# Patient Record
Sex: MALE | Race: WHITE | NOT HISPANIC OR LATINO | Employment: FULL TIME | URBAN - METROPOLITAN AREA
[De-identification: names, ages, dates, MRNs, and addresses within clinical notes are randomized per-mention and may not be internally consistent; named-entity substitution may affect disease eponyms.]

---

## 2017-01-04 ENCOUNTER — ALLSCRIPTS OFFICE VISIT (OUTPATIENT)
Dept: OTHER | Facility: OTHER | Age: 62
End: 2017-01-04

## 2017-01-26 ENCOUNTER — ALLSCRIPTS OFFICE VISIT (OUTPATIENT)
Dept: OTHER | Facility: OTHER | Age: 62
End: 2017-01-26

## 2017-10-16 DIAGNOSIS — Z12.5 ENCOUNTER FOR SCREENING FOR MALIGNANT NEOPLASM OF PROSTATE: ICD-10-CM

## 2017-10-16 DIAGNOSIS — E07.9 DISORDER OF THYROID: ICD-10-CM

## 2017-10-16 DIAGNOSIS — R00.0 TACHYCARDIA: ICD-10-CM

## 2017-10-16 DIAGNOSIS — Z00.00 ENCOUNTER FOR GENERAL ADULT MEDICAL EXAMINATION WITHOUT ABNORMAL FINDINGS: ICD-10-CM

## 2017-10-19 ENCOUNTER — ALLSCRIPTS OFFICE VISIT (OUTPATIENT)
Dept: OTHER | Facility: OTHER | Age: 62
End: 2017-10-19

## 2017-10-20 NOTE — PROGRESS NOTES
Assessment  1  Leg pain, bilateral (729 5) (M79 664,M79 606)    Plan  Acute low back pain    · Diclofenac Sodium 75 MG Oral Tablet Delayed Release; take 1 tablet twice daily  after meals   · PredniSONE 20 MG Oral Tablet; 1 bid x 5 days , 1 qd x 5 days  Encounter for screening colonoscopy    · COLONOSCOPY; Status:Active - Retrospective Authorization; Requested UFD:[id number]; Thyroid disease    · Levothyroxine Sodium 125 MCG Oral Tablet; take 1 tablet by mouth once daily    Discussion/Summary    Will have patient complete 10 day course of prednisone  Prescribed Voltaren as needed for back pain  Will have patient get MRI if no relief of symptoms  Chief Complaint  bilateral leg pain from lower back  MM-REVIEW LABS/MEDICATIONScolo      History of Present Illness  63year old male presents for annual physical  complains of intermittent throbbing bilateral leg pain  Better with walking and states it is worse with standing still  States he has been taking medication for the back pain with little relief  States he would like something else for the pain  Denies other current medical concerns  Review of Systems    Constitutional: No fever or chills, feels well, no tiredness, no recent weight gain or weight loss  Eyes: No complaints of eye pain, no red eyes, no discharge from eyes, no itchy eyes  ENT: no complaints of earache, no hearing loss, no nosebleeds, no nasal discharge, no sore throat, no hoarseness  Cardiovascular: No complaints of slow heart rate, no fast heart rate, no chest pain, no palpitations, no leg claudication, no lower extremity  Respiratory: No complaints of shortness of breath, no wheezing, no cough, no SOB on exertion, no orthopnea or PND  Gastrointestinal: No complaints of abdominal pain, no constipation, no nausea or vomiting, no diarrhea or bloody stools  Musculoskeletal: limb pain,-- myalgias-- and-- joint stiffness, but-- as noted in HPI     Neurological: No compliants of headache, no confusion, no convulsions, no numbness or tingling, no dizziness or fainting, no limb weakness, no difficulty walking  Psychiatric: Is not suicidal, no sleep disturbances, no anxiety or depression, no change in personality, no emotional problems  Active Problems  1  Acute bronchitis, unspecified organism (466 0) (J20 9)   2  Chest pain (786 50) (R07 9)   3  Cough (786 2) (R05)   4  Encounter for prostate cancer screening (V76 44) (Z12 5)   5  Encounter for screening colonoscopy (V76 51) (Z12 11)   6  Influenza (487 1) (J11 1)   7  Knee arthropathy (716 96) (M17 10)   8  Lightheadedness (780 4) (R42)   9  Palpitations (785 1) (R00 2)   10  Proctalgia fugax (564 6) (K59 4)   11  Tachycardia (785 0) (R00 0)   12  Thyroid disease (246 9) (E07 9)    Past Medical History  1  History of Acute low back pain (724 2) (M54 5)   2  History of Hearing problem (V41 2) (H91 90)   3  History of arthritis (V13 4) (Z87 39)    The active problems and past medical history were reviewed and updated today  Surgical History  1  History of Hernia Repair   2  History of Knee Surgery   3  History of Neuroplasty Median Nerve At Carpal Tunnel    Family History  Mother    1  Family history of arthritis (V17 7) (Z82 61)  Father    2  Family history of arthritis (V17 7) (Z82 61)   3  Family history of coronary artery disease (V17 3) (Z82 49)   4  Family history of diabetes mellitus (V18 0) (Z83 3)   5  Family history of hyperlipidemia (V18 19) (Z83 49)   6  Family history of hypertension (V17 49) (Z82 49)  Sister    9  Family history of diabetes mellitus (V18 0) (Z83 3)   8  Family history of hypertension (V17 49) (Z82 49)   9  Family history of sudden cardiac death (SCD) (V17 41) (Z82 41)    Social History   · Former smoker (X32 43) (M20 174)   ·     Current Meds   1  Azithromycin 250 MG Oral Tablet; TAKE 2 TABLETS ON DAY 1 THEN TAKE 1 TABLET A   DAY FOR 4 DAYS;    Therapy: 72CGR5620 to (Last Rx:26Jan2017) Requested for: 11YIZ8434 Ordered   2  Butalbital-APAP-Caffeine -40 MG Oral Tablet; TAKE 1 OR 2 TABLETS EVERY 4   HOURS AS NEEDED FOR HEADACHE  DO NOT EXCEED 6 TABLETS IN 24 HOURS; Therapy: 60Svp1435 to (Evaluate:12Jun2016); Last Rx:13Apr2016 Ordered   3  Cheratussin -10 MG/5ML Oral Syrup; 2 tsp every 6 hours as needed cough; Therapy: 41FDC5114 to (Last Rx:21Jan2017) Ordered   4  Levothyroxine Sodium 100 MCG Oral Tablet; take one tablet by mouth daily; Therapy: 41NID8238 to (Last Alric Marissa)  Requested for: 69BGX3024 Ordered   5  Metoprolol Tartrate 25 MG Oral Tablet; TAKE 1 TABLET TWICE DAILY; Therapy: 29ORM7676 to (([phone])  Requested for: 97Gpr7256; Last   Rx:84Fqj7768; Status: ACTIVE - Retrospective By Protocol Authorization Ordered   6  PredniSONE 20 MG Oral Tablet; 1 bid x 5 days , 1 qd x 5 days; Therapy: 56TJC5183 to (Evaluate:88Hnc2515)  Requested for: 44IAZ7374; Last   Rx:26Jan2017 Ordered   7  RaNITidine HCl - 150 MG Oral Tablet; TAKE 1 TABLET DAILY AS NEEDED; Therapy: 85XBV9111 to Recorded   8  Sleep Aid (DiphenhydrAMINE) 25 MG Oral Tablet; TAKE 1 TABLET AT BEDTIME; Therapy: 26COX7624 to Recorded    Allergies  1  Penicillins    Vitals  Vital Signs    Recorded: 86NNL8316 01:22PM   Heart Rate 68   Systolic 508   Diastolic 72   Height 5 ft 10 in   Weight 214 lb    BMI Calculated 30 71   BSA Calculated 2 15   O2 Saturation 98     Physical Exam    Constitutional   General appearance: No acute distress, well appearing and well nourished  Pulmonary   Respiratory effort: No increased work of breathing or signs of respiratory distress  Auscultation of lungs: Clear to auscultation, equal breath sounds bilaterally, no wheezes, no rales, no rhonci  Cardiovascular   Auscultation of heart: Normal rate and rhythm, normal S1 and S2, without murmurs      Examination of extremities for edema and/or varicosities: Normal     Lymphatic   Palpation of lymph nodes in neck: No lymphadenopathy  Musculoskeletal   Gait and station: Normal     Digits and nails: Normal without clubbing or cyanosis  Inspection/palpation of joints, bones, and muscles: Normal          Health Management  Encounter for screening colonoscopy   COLONOSCOPY; every 10 years; Next Due: 34PDM4001;  Overdue    Signatures   Electronically signed by : Radha Bolaños MD; Oct 19 2017  2:26PM EST                       (Author)

## 2017-12-18 ENCOUNTER — ALLSCRIPTS OFFICE VISIT (OUTPATIENT)
Dept: OTHER | Facility: OTHER | Age: 62
End: 2017-12-18

## 2018-01-13 VITALS
BODY MASS INDEX: 30.64 KG/M2 | WEIGHT: 214 LBS | DIASTOLIC BLOOD PRESSURE: 72 MMHG | HEIGHT: 70 IN | OXYGEN SATURATION: 98 % | HEART RATE: 68 BPM | SYSTOLIC BLOOD PRESSURE: 122 MMHG

## 2018-01-13 VITALS
TEMPERATURE: 99.1 F | BODY MASS INDEX: 30.35 KG/M2 | HEIGHT: 70 IN | RESPIRATION RATE: 18 BRPM | HEART RATE: 64 BPM | SYSTOLIC BLOOD PRESSURE: 120 MMHG | OXYGEN SATURATION: 96 % | WEIGHT: 212 LBS | DIASTOLIC BLOOD PRESSURE: 80 MMHG

## 2018-01-14 VITALS
BODY MASS INDEX: 30.35 KG/M2 | TEMPERATURE: 97.9 F | OXYGEN SATURATION: 98 % | SYSTOLIC BLOOD PRESSURE: 114 MMHG | DIASTOLIC BLOOD PRESSURE: 68 MMHG | HEART RATE: 65 BPM | WEIGHT: 212 LBS | HEIGHT: 70 IN

## 2018-01-23 VITALS
OXYGEN SATURATION: 98 % | HEIGHT: 70 IN | SYSTOLIC BLOOD PRESSURE: 128 MMHG | HEART RATE: 66 BPM | BODY MASS INDEX: 30.64 KG/M2 | DIASTOLIC BLOOD PRESSURE: 74 MMHG | WEIGHT: 214 LBS

## 2018-01-23 NOTE — PROGRESS NOTES
Assessment    1  Encounter for preventive health examination (V70 0) (Z00 00)    Discussion/Summary  Advice and education were given regarding aerobic exercise and sunscreen use  Chief Complaint  Routine PE      Advance Directives  Advance Directive [facility]:   NO - Patient does not have an advance health care directive  History of Present Illness  HM, Adult Male: The patient is being seen for a health maintenance evaluation  Social History: Household members include spouse  He is   Work status: working full time  The patient is a former cigarette smoker and has never used smokeless tobacco  He reports rare alcohol use and denies binge drinking  The patient has no concerns about alcohol abuse  He has never used illicit drugs  General Health: The patient's health since the last visit is described as good  He has regular dental visits  He denies vision problems  He has hearing loss  hearing is slightly decreased  Immunizations status: up to date  Lifestyle:  He consumes a diverse and healthy diet  He does not have any weight concerns  He does not exercise regularly  He does not use tobacco  He denies alcohol use  He denies drug use  Reproductive health:  the patient is sexually active  Screening: Active Problems    1  Acute bronchitis, unspecified organism (466 0) (J20 9)   2  Chest pain (786 50) (R07 9)   3  Cough (786 2) (R05)   4  Encounter for prostate cancer screening (V76 44) (Z12 5)   5  Encounter for screening colonoscopy (V76 51) (Z12 11)   6  Influenza (487 1) (J11 1)   7  Knee arthropathy (716 96) (M17 10)   8  Leg pain, bilateral (729 5) (M79 604,M79 605)   9  Lightheadedness (780 4) (R42)   10  Palpitations (785 1) (R00 2)   11  Proctalgia fugax (564 6) (K59 4)   12  Tachycardia (785 0) (R00 0)   13   Thyroid disease (246 9) (E07 9)    Past Medical History    · History of Acute low back pain (724 2) (M54 5)   · History of Hearing problem (V41 2) (H91 90)   · History of arthritis (V13 4) (Z87 39)    Surgical History    · History of Hernia Repair   · History of Knee Surgery   · History of Neuroplasty Median Nerve At Carpal Tunnel    Family History  Mother    · Family history of arthritis (V17 7) (Z82 61)  Father    · Family history of arthritis (V17 7) (Z82 61)   · Family history of coronary artery disease (V17 3) (Z82 49)   · Family history of diabetes mellitus (V18 0) (Z83 3)   · Family history of hyperlipidemia (V18 19) (Z83 49)   · Family history of hypertension (V17 49) (Z82 49)  Sister    · Family history of diabetes mellitus (V18 0) (Z83 3)   · Family history of hypertension (V17 49) (Z82 49)   · Family history of sudden cardiac death (SCD) (V17 41) (Z82 41)  Family History    · Denied: Family history of substance abuse   · No family history of mental disorder    Social History    · Former smoker (R85 19) (D24 212)   ·     Current Meds   1  Azithromycin 250 MG Oral Tablet; TAKE 2 TABLETS ON DAY 1 THEN TAKE 1 TABLET A   DAY FOR 4 DAYS; Therapy: 91TQN3357 to (Last Rx:26Jan2017)  Requested for: 26Jan2017 Ordered   2  Butalbital-APAP-Caffeine -40 MG Oral Tablet; TAKE 1 OR 2 TABLETS EVERY 4   HOURS AS NEEDED FOR HEADACHE  DO NOT EXCEED 6 TABLETS IN 24 HOURS; Therapy: 13Apr2016 to (Evaluate:12Jun2016); Last Rx:13Apr2016 Ordered   3  Cheratussin -10 MG/5ML Oral Syrup; 2 tsp every 6 hours as needed cough; Therapy: 53HNA3903 to (Last Rx:21Jan2017) Ordered   4  Diclofenac Sodium 75 MG Oral Tablet Delayed Release; take 1 tablet twice daily after   meals; Therapy: 00URZ4281 to (Evaluate:29Fey0071)  Requested for: 19Oct2017; Last   Rx:19Oct2017 Ordered   5  Levothyroxine Sodium 125 MCG Oral Tablet; take 1 tablet by mouth once daily; Therapy: 10FMB4212 to (Evaluate:41Zhu1660)  Requested for: 19Oct2017; Last   Rx:19Oct2017 Ordered   6  Metoprolol Tartrate 25 MG Oral Tablet; TAKE 1 TABLET TWICE DAILY;    Therapy: 07DOU1241 to ([phone])  Requested for: 22XKN1075; Last   Rx:38Ylb2433; Status: ACTIVE - Retrospective By Protocol Authorization Ordered   7  PredniSONE 20 MG Oral Tablet; 1 bid x 5 days , 1 qd x 5 days; Therapy: 74HJG9163 to (Kb Simmering)  Requested for: 19Oct2017; Last   Rx:19Oct2017 Ordered   8  RaNITidine HCl - 150 MG Oral Tablet; TAKE 1 TABLET DAILY AS NEEDED; Therapy: 10DBS5263 to Recorded   9  Sleep Aid (DiphenhydrAMINE) 25 MG Oral Tablet; TAKE 1 TABLET AT BEDTIME; Therapy: 83UJP2862 to Recorded    Allergies    1  Penicillins    Vitals   Recorded: 68VFH7097 07:29AM   Heart Rate 66   Systolic 205   Diastolic 74   Height 5 ft 10 in   Weight 214 lb    BMI Calculated 30 71   BSA Calculated 2 15   O2 Saturation 98     Physical Exam    Constitutional   General appearance: No acute distress, well appearing and well nourished  Neck   Neck: Supple, symmetric, trachea midline, no masses  Thyroid: Normal, no thyromegaly  Pulmonary   Auscultation of lungs: Clear to auscultation  Cardiovascular   Palpation of heart: Normal PMI, no thrills  Auscultation of heart: Normal rate and rhythm, normal S1 and S2, no murmurs  Abdomen   Abdomen: Non-tender, no masses  Liver and spleen: No hepatomegaly or splenomegaly  Results/Data  PHQ-2 Adult Depression Screening 12Skb8381 07:31AM User, s     Test Name Result Flag Reference   PHQ-2 Adult Depression Score 0     Over the last two weeks, how often have you been bothered by any of the following problems? Little interest or pleasure in doing things: Not at all - 0  Feeling down, depressed, or hopeless: Not at all - 0   PHQ-2 Adult Depression Screening Negative         Health Management  Encounter for screening colonoscopy   COLONOSCOPY; every 10 years; Next Due: 69CTV9650;  Overdue    Signatures   Electronically signed by : Hilario Duane, MD; Dec 18 2017  7:39AM EST                       (Author)

## 2018-01-28 DIAGNOSIS — I10 HYPERTENSION, UNSPECIFIED TYPE: Primary | ICD-10-CM

## 2018-01-30 DIAGNOSIS — I10 HYPERTENSION, UNSPECIFIED TYPE: ICD-10-CM

## 2018-04-06 ENCOUNTER — OFFICE VISIT (OUTPATIENT)
Dept: FAMILY MEDICINE CLINIC | Facility: CLINIC | Age: 63
End: 2018-04-06
Payer: COMMERCIAL

## 2018-04-06 VITALS
RESPIRATION RATE: 16 BRPM | HEIGHT: 70 IN | BODY MASS INDEX: 30.64 KG/M2 | DIASTOLIC BLOOD PRESSURE: 80 MMHG | SYSTOLIC BLOOD PRESSURE: 120 MMHG | WEIGHT: 214 LBS | HEART RATE: 54 BPM | OXYGEN SATURATION: 99 %

## 2018-04-06 DIAGNOSIS — M17.0 PRIMARY OSTEOARTHRITIS OF BOTH KNEES: Primary | ICD-10-CM

## 2018-04-06 PROCEDURE — 1036F TOBACCO NON-USER: CPT | Performed by: FAMILY MEDICINE

## 2018-04-06 PROCEDURE — 99213 OFFICE O/P EST LOW 20 MIN: CPT | Performed by: FAMILY MEDICINE

## 2018-04-06 RX ORDER — PREDNISONE 20 MG/1
TABLET ORAL
Qty: 15 TABLET | Refills: 3 | Status: SHIPPED | OUTPATIENT
Start: 2018-04-06 | End: 2019-11-18 | Stop reason: SDUPTHER

## 2018-04-06 RX ORDER — LEVOTHYROXINE SODIUM 0.12 MG/1
TABLET ORAL
COMMUNITY
Start: 2018-03-31 | End: 2018-09-29 | Stop reason: SDUPTHER

## 2018-04-06 RX ORDER — DICLOFENAC SODIUM 75 MG/1
TABLET, DELAYED RELEASE ORAL
COMMUNITY
Start: 2018-01-16

## 2018-04-06 NOTE — PROGRESS NOTES
[address] [address]        NAME: Tomer Wise is a 66 y o  male  : 1955    MRN: [id number]  DATE: 2018  TIME: 7:55 AM    Assessment and Plan   Primary osteoarthritis of both knees [M17 0]  1  Primary osteoarthritis of both knees  predniSONE 20 mg tablet         Patient Instructions     Patient Instructions   Good candidate for synvisc but high deductible---will try pred Q 3mos          Chief Complaint     Chief Complaint   Patient presents with    Pain     bl leg pain         History of Present Illness       bilat knee arthr---pain for mos--no swelling        Review of Systems   Review of Systems   Musculoskeletal:        See HPI         Current Medications       Current Outpatient Prescriptions:     diclofenac (VOLTAREN) 75 mg EC tablet, , Disp: , Rfl:     levothyroxine 125 mcg tablet, , Disp: , Rfl:     metoprolol tartrate (LOPRESSOR) 25 mg tablet, TAKE 1 TABLET TWICE DAILY  , Disp: 180 tablet, Rfl: 1    predniSONE 20 mg tablet, TAKE 1 TABLET BID X 5 DAYS THEN TAKE 1 TABLET QD FOR 5 DAYS, Disp: 15 tablet, Rfl: 3    Current Allergies     Allergies as of 2018 - Reviewed 2018   Allergen Reaction Noted    Penicillins Hives 2015            The following portions of the patient's history were reviewed and updated as appropriate: allergies, current medications, past family history, past medical history, past social history, past surgical history and problem list      No past medical history on file  No past surgical history on file  Family History   Problem Relation Age of Onset    Prostate cancer Father     Diabetes Father     Diabetes Sister     Substance Abuse Neg Hx     Mental illness Neg Hx          Medications have been verified          Objective   /80 (BP Location: Right arm, Patient Position: Sitting, Cuff Size: Large)   Pulse (!) 54   Resp 16   Ht 5' 10" (1 778 m)   Wt 97 1 kg (214 lb)   SpO2 99%   BMI 30 71 kg/m² Physical Exam     Physical Exam   Musculoskeletal:   bilat knee arthritis by exam

## 2018-08-03 ENCOUNTER — OFFICE VISIT (OUTPATIENT)
Dept: FAMILY MEDICINE CLINIC | Facility: CLINIC | Age: 63
End: 2018-08-03
Payer: COMMERCIAL

## 2018-08-03 VITALS
WEIGHT: 216 LBS | DIASTOLIC BLOOD PRESSURE: 76 MMHG | SYSTOLIC BLOOD PRESSURE: 144 MMHG | OXYGEN SATURATION: 99 % | HEIGHT: 70 IN | HEART RATE: 66 BPM | BODY MASS INDEX: 30.92 KG/M2

## 2018-08-03 DIAGNOSIS — M54.42 CHRONIC LEFT-SIDED LOW BACK PAIN WITH LEFT-SIDED SCIATICA: ICD-10-CM

## 2018-08-03 DIAGNOSIS — G89.29 CHRONIC LEFT-SIDED LOW BACK PAIN WITH LEFT-SIDED SCIATICA: ICD-10-CM

## 2018-08-03 DIAGNOSIS — R52 PAIN: Primary | ICD-10-CM

## 2018-08-03 PROCEDURE — 99213 OFFICE O/P EST LOW 20 MIN: CPT | Performed by: FAMILY MEDICINE

## 2018-08-03 PROCEDURE — 3008F BODY MASS INDEX DOCD: CPT | Performed by: FAMILY MEDICINE

## 2018-08-03 RX ORDER — BUTALBITAL, ACETAMINOPHEN AND CAFFEINE 50; 325; 40 MG/1; MG/1; MG/1
2 TABLET ORAL EVERY 4 HOURS PRN
COMMUNITY
Start: 2016-04-13 | End: 2018-08-03 | Stop reason: SDUPTHER

## 2018-08-03 RX ORDER — BUTALBITAL, ACETAMINOPHEN AND CAFFEINE 50; 325; 40 MG/1; MG/1; MG/1
1 TABLET ORAL EVERY 6 HOURS PRN
Qty: 90 TABLET | Refills: 5 | Status: SHIPPED | OUTPATIENT
Start: 2018-08-03 | End: 2018-08-03 | Stop reason: SDUPTHER

## 2018-08-03 RX ORDER — BUTALBITAL, ACETAMINOPHEN AND CAFFEINE 50; 325; 40 MG/1; MG/1; MG/1
1 TABLET ORAL EVERY 6 HOURS PRN
Qty: 90 TABLET | Refills: 5 | Status: SHIPPED | OUTPATIENT
Start: 2018-08-03 | End: 2019-11-18 | Stop reason: SDUPTHER

## 2018-08-03 RX ORDER — RANITIDINE 150 MG/1
1 TABLET ORAL DAILY PRN
COMMUNITY
Start: 2015-04-06

## 2018-08-03 NOTE — PROGRESS NOTES
[address] [facility]        NAME: Afua Alvarez is a 66 y o  male  : 1955    MRN: [id number]  DATE: August 3, 2018  TIME: 2:46 PM    Assessment and Plan   Pain [R52]  1  Pain  butalbital-acetaminophen-caffeine (FIORICET,ESGIC) -40 mg per tablet   2  Chronic left-sided low back pain with left-sided sciatica           Patient Instructions     Patient Instructions   F/u prn          Chief Complaint     Chief Complaint   Patient presents with    Leg Pain     left         History of Present Illness       C/o L sciatica        Review of Systems   Review of Systems   Constitutional: Negative  Respiratory: Negative  Cardiovascular: Negative  Musculoskeletal:        L sciatica         Current Medications       Current Outpatient Prescriptions:     butalbital-acetaminophen-caffeine (FIORICET,ESGIC) -40 mg per tablet, Take 1 tablet by mouth every 6 (six) hours as needed for headaches, Disp: 90 tablet, Rfl: 5    diclofenac (VOLTAREN) 75 mg EC tablet, , Disp: , Rfl:     levothyroxine 125 mcg tablet, , Disp: , Rfl:     metoprolol tartrate (LOPRESSOR) 25 mg tablet, TAKE 1 TABLET TWICE DAILY  , Disp: 180 tablet, Rfl: 1    predniSONE 20 mg tablet, TAKE 1 TABLET BID X 5 DAYS THEN TAKE 1 TABLET QD FOR 5 DAYS, Disp: 15 tablet, Rfl: 3    ranitidine (ZANTAC) 150 mg tablet, Take 1 tablet by mouth daily as needed, Disp: , Rfl:     Current Allergies     Allergies as of 2018 - Reviewed 2018   Allergen Reaction Noted    Penicillins Hives 2015            The following portions of the patient's history were reviewed and updated as appropriate: allergies, current medications, past family history, past medical history, past social history, past surgical history and problem list      No past medical history on file  No past surgical history on file      Family History   Problem Relation Age of Onset    Prostate cancer Father     Diabetes Father     Diabetes Sister  Substance Abuse Neg Hx     Mental illness Neg Hx          Medications have been verified          Objective   /76   Pulse 66   Ht 5' 10" (1 778 m)   Wt 98 kg (216 lb)   SpO2 99%   BMI 30 99 kg/m²        Physical Exam     Physical Exam   Musculoskeletal:   + SLR at 30 degr

## 2018-08-04 DIAGNOSIS — I10 HYPERTENSION, UNSPECIFIED TYPE: ICD-10-CM

## 2018-09-29 DIAGNOSIS — E06.3 HYPOTHYROIDISM DUE TO HASHIMOTO'S THYROIDITIS: Primary | ICD-10-CM

## 2018-09-29 DIAGNOSIS — E03.8 HYPOTHYROIDISM DUE TO HASHIMOTO'S THYROIDITIS: Primary | ICD-10-CM

## 2018-09-29 RX ORDER — LEVOTHYROXINE SODIUM 0.12 MG/1
TABLET ORAL
Qty: 90 TABLET | Refills: 3 | Status: SHIPPED | OUTPATIENT
Start: 2018-09-29 | End: 2018-12-19 | Stop reason: SDUPTHER

## 2018-11-23 DIAGNOSIS — E03.9 HYPOTHYROIDISM, UNSPECIFIED TYPE: Primary | ICD-10-CM

## 2018-11-23 DIAGNOSIS — I10 ESSENTIAL HYPERTENSION: ICD-10-CM

## 2018-11-23 DIAGNOSIS — Z12.5 SCREENING PSA (PROSTATE SPECIFIC ANTIGEN): ICD-10-CM

## 2018-12-14 ENCOUNTER — OFFICE VISIT (OUTPATIENT)
Dept: FAMILY MEDICINE CLINIC | Facility: CLINIC | Age: 63
End: 2018-12-14
Payer: COMMERCIAL

## 2018-12-14 VITALS
HEIGHT: 69 IN | HEART RATE: 72 BPM | SYSTOLIC BLOOD PRESSURE: 134 MMHG | WEIGHT: 210 LBS | DIASTOLIC BLOOD PRESSURE: 84 MMHG | OXYGEN SATURATION: 99 % | BODY MASS INDEX: 31.1 KG/M2

## 2018-12-14 DIAGNOSIS — J01.00 ACUTE NON-RECURRENT MAXILLARY SINUSITIS: Primary | ICD-10-CM

## 2018-12-14 PROCEDURE — 99213 OFFICE O/P EST LOW 20 MIN: CPT | Performed by: FAMILY MEDICINE

## 2018-12-14 PROCEDURE — 3008F BODY MASS INDEX DOCD: CPT | Performed by: FAMILY MEDICINE

## 2018-12-14 RX ORDER — CEPHALEXIN 500 MG/1
500 CAPSULE ORAL EVERY 8 HOURS SCHEDULED
Qty: 30 CAPSULE | Refills: 0 | Status: SHIPPED | OUTPATIENT
Start: 2018-12-14 | End: 2018-12-24

## 2018-12-14 NOTE — PROGRESS NOTES
[facility]        NAME: Aleida Mcknight is a 66 y o  male  : 1955    MRN: [id number]  DATE: 2018  TIME: 1:53 PM    Assessment and Plan   Acute non-recurrent maxillary sinusitis [J01 00]  1  Acute non-recurrent maxillary sinusitis  cephalexin (KEFLEX) 500 mg capsule         Patient Instructions     Patient Instructions   See meds          Chief Complaint     Chief Complaint   Patient presents with    Cough     x 1week    Sore Throat         History of Present Illness       C/o ST for 1 wk        Review of Systems   Review of Systems   Constitutional: Negative for fatigue, fever and unexpected weight change  HENT: Positive for congestion, postnasal drip, sinus pain, sinus pressure and sore throat  Eyes: Negative for visual disturbance  Respiratory: Positive for cough  Negative for shortness of breath and wheezing  Cardiovascular: Negative for chest pain and palpitations  Gastrointestinal: Negative for abdominal pain, diarrhea, nausea and vomiting  Current Medications       Current Outpatient Prescriptions:     butalbital-acetaminophen-caffeine (FIORICET,ESGIC) -40 mg per tablet, Take 1 tablet by mouth every 6 (six) hours as needed for headaches, Disp: 90 tablet, Rfl: 5    cephalexin (KEFLEX) 500 mg capsule, Take 1 capsule (500 mg total) by mouth every 8 (eight) hours for 10 days, Disp: 30 capsule, Rfl: 0    diclofenac (VOLTAREN) 75 mg EC tablet, , Disp: , Rfl:     levothyroxine 125 mcg tablet, TAKE 1 TABLET BY MOUTH ONCE DAILY, Disp: 90 tablet, Rfl: 3    metoprolol tartrate (LOPRESSOR) 25 mg tablet, TAKE 1 TABLET TWICE DAILY  , Disp: 180 tablet, Rfl: 1    predniSONE 20 mg tablet, TAKE 1 TABLET BID X 5 DAYS THEN TAKE 1 TABLET QD FOR 5 DAYS, Disp: 15 tablet, Rfl: 3    ranitidine (ZANTAC) 150 mg tablet, Take 1 tablet by mouth daily as needed, Disp: , Rfl:     Current Allergies     Allergies as of 2018 - Reviewed 2018   Allergen Reaction Noted    Penicillins Hives 11/03/2015            The following portions of the patient's history were reviewed and updated as appropriate: allergies, current medications, past family history, past medical history, past social history, past surgical history and problem list      No past medical history on file  No past surgical history on file  Family History   Problem Relation Age of Onset    Prostate cancer Father     Diabetes Father     Diabetes Sister     Substance Abuse Neg Hx     Mental illness Neg Hx          Medications have been verified  Objective   /84   Pulse 72   Ht 5' 9" (1 753 m)   Wt 95 3 kg (210 lb)   SpO2 99%   BMI 31 01 kg/m²        Physical Exam     Physical Exam   Constitutional: He is oriented to person, place, and time  He appears well-developed and well-nourished  No distress  HENT:   Head: Normocephalic and atraumatic  Right Ear: Tympanic membrane, external ear and ear canal normal    Left Ear: Tympanic membrane, external ear and ear canal normal    Nose: Rhinorrhea present  No epistaxis  Right sinus exhibits maxillary sinus tenderness  Left sinus exhibits maxillary sinus tenderness  Mouth/Throat: Uvula is midline, oropharynx is clear and moist and mucous membranes are normal    bilat sinus swollen/tender   Cardiovascular: Normal rate, regular rhythm and normal heart sounds  Exam reveals no gallop and no friction rub  No murmur heard  Pulmonary/Chest: Effort normal and breath sounds normal  No respiratory distress  He has no wheezes  He has no rales  Abdominal: He exhibits no distension  Musculoskeletal: Normal range of motion  Neurological: He is alert and oriented to person, place, and time  Skin: He is not diaphoretic  Psychiatric: He has a normal mood and affect  His behavior is normal  Judgment and thought content normal    Nursing note and vitals reviewed

## 2018-12-19 ENCOUNTER — OFFICE VISIT (OUTPATIENT)
Dept: FAMILY MEDICINE CLINIC | Facility: CLINIC | Age: 63
End: 2018-12-19
Payer: COMMERCIAL

## 2018-12-19 DIAGNOSIS — E06.3 HYPOTHYROIDISM DUE TO HASHIMOTO'S THYROIDITIS: ICD-10-CM

## 2018-12-19 DIAGNOSIS — E03.8 HYPOTHYROIDISM DUE TO HASHIMOTO'S THYROIDITIS: ICD-10-CM

## 2018-12-19 DIAGNOSIS — Z00.00 WELLNESS EXAMINATION: Primary | ICD-10-CM

## 2018-12-19 PROCEDURE — 99396 PREV VISIT EST AGE 40-64: CPT | Performed by: FAMILY MEDICINE

## 2018-12-19 RX ORDER — LEVOTHYROXINE SODIUM 0.12 MG/1
125 TABLET ORAL DAILY
Qty: 90 TABLET | Refills: 3 | Status: SHIPPED | OUTPATIENT
Start: 2018-12-19 | End: 2019-11-18 | Stop reason: SDUPTHER

## 2018-12-19 NOTE — PROGRESS NOTES
HPI:  Isabel Barakat is a 66 y o  male here for his yearly health maintenance exam    There is no problem list on file for this patient  No past medical history on file  1  Advanced Directive: n     2  Durable Power of  for Healthcare: n     3  Social History:           Drug and alcohol History: n                  4  Immunizations up to date: y                 Lifestyle:                           Healthy Diet:y                          Alcohol Use:n                          Tobacco Use:n                          Regular exercise:y                          Weight concerns:n                               5  Over the past 2 weeks, how often have you been bothered by the following:              Little interest or pleasure in doing things:n              Felling down, depressed or hopeless:n       Current Outpatient Prescriptions   Medication Sig Dispense Refill    butalbital-acetaminophen-caffeine (FIORICET,ESGIC) -40 mg per tablet Take 1 tablet by mouth every 6 (six) hours as needed for headaches 90 tablet 5    cephalexin (KEFLEX) 500 mg capsule Take 1 capsule (500 mg total) by mouth every 8 (eight) hours for 10 days 30 capsule 0    diclofenac (VOLTAREN) 75 mg EC tablet       levothyroxine 125 mcg tablet Take 1 tablet (125 mcg total) by mouth daily 90 tablet 3    metoprolol tartrate (LOPRESSOR) 25 mg tablet TAKE 1 TABLET TWICE DAILY  180 tablet 1    predniSONE 20 mg tablet TAKE 1 TABLET BID X 5 DAYS THEN TAKE 1 TABLET QD FOR 5 DAYS 15 tablet 3    ranitidine (ZANTAC) 150 mg tablet Take 1 tablet by mouth daily as needed       No current facility-administered medications for this visit        Allergies   Allergen Reactions    Penicillins Hives     Immunization History   Administered Date(s) Administered    Hep A, adult 01/08/2002, 07/12/2002    Hep B, adult 12/21/2001, 01/25/2002, 07/12/2002    Influenza Quadrivalent Preservative Free 3 years and older IM 12/28/2016    MMR 12/21/2001 Patient Care Team:  Jetty Duverney, MD as PCP - General    Review of Systems   Constitutional: Negative for fatigue, fever and unexpected weight change  HENT: Negative for congestion, sinus pain and sore throat  Eyes: Negative for visual disturbance  Respiratory: Negative for shortness of breath and wheezing  Cardiovascular: Negative for chest pain and palpitations  Gastrointestinal: Negative for abdominal pain, nausea and vomiting  Musculoskeletal: Negative  Negative for arthralgias and myalgias  Neurological: Negative for syncope, weakness and numbness  Psychiatric/Behavioral: Negative  Negative for confusion, dysphoric mood and suicidal ideas  Physical Exam :  Physical Exam   Constitutional: He is oriented to person, place, and time  Vital signs are normal  He appears well-developed and well-nourished  HENT:   Right Ear: Ear canal normal  Tympanic membrane is not injected  Left Ear: Ear canal normal  Tympanic membrane is not injected  Nose: Nose normal    Mouth/Throat: Oropharynx is clear and moist    Eyes: Pupils are equal, round, and reactive to light  Conjunctivae and EOM are normal  Right eye exhibits no discharge  Left eye exhibits no discharge  Neck: Normal range of motion  Neck supple  No thyromegaly present  Cardiovascular: Normal rate, regular rhythm and normal heart sounds  No murmur heard  Pulmonary/Chest: Effort normal and breath sounds normal  No respiratory distress  He has no wheezes  Abdominal: Soft  Bowel sounds are normal  He exhibits no distension  There is no tenderness  Musculoskeletal: Normal range of motion  Lymphadenopathy:     He has no cervical adenopathy  Neurological: He is alert and oriented to person, place, and time  He has normal strength and normal reflexes  He is not disoriented  No sensory deficit  Gait normal    Skin: Skin is warm and dry  Psychiatric: He has a normal mood and affect   His speech is normal and behavior is normal  Judgment and thought content normal  Cognition and memory are normal          Assessment and Plan:  1  Wellness examination     2   Hypothyroidism due to Hashimoto's thyroiditis  levothyroxine 125 mcg tablet       Health Maintenance Due   Topic Date Due    Hepatitis C Screening  1955    Depression Screening PHQ  1955    CRC Screening: Colonoscopy  1955    DTaP,Tdap,and Td Vaccines (1 - Tdap) 02/01/1978    INFLUENZA VACCINE  07/01/2018    Medicare Annual Wellness Visit (AWV)  12/18/2018

## 2019-02-01 ENCOUNTER — OFFICE VISIT (OUTPATIENT)
Dept: FAMILY MEDICINE CLINIC | Facility: CLINIC | Age: 64
End: 2019-02-01
Payer: COMMERCIAL

## 2019-02-01 VITALS
HEIGHT: 69 IN | DIASTOLIC BLOOD PRESSURE: 70 MMHG | BODY MASS INDEX: 31.55 KG/M2 | WEIGHT: 213 LBS | OXYGEN SATURATION: 98 % | SYSTOLIC BLOOD PRESSURE: 128 MMHG | HEART RATE: 59 BPM

## 2019-02-01 DIAGNOSIS — M17.12 OSTEOARTHRITIS OF LEFT KNEE, UNSPECIFIED OSTEOARTHRITIS TYPE: ICD-10-CM

## 2019-02-01 DIAGNOSIS — R53.83 FATIGUE, UNSPECIFIED TYPE: Primary | ICD-10-CM

## 2019-02-01 PROCEDURE — 3008F BODY MASS INDEX DOCD: CPT | Performed by: FAMILY MEDICINE

## 2019-02-01 PROCEDURE — 99213 OFFICE O/P EST LOW 20 MIN: CPT | Performed by: FAMILY MEDICINE

## 2019-02-01 NOTE — PROGRESS NOTES
[facility]        NAME: Simon Mercedes is a 60 y o  male  : 1955    MRN: [id number]  DATE: 2019  TIME: 8:45 AM    Assessment and Plan   Fatigue, unspecified type [R53 [id number]]  1  Fatigue, unspecified type     2  Osteoarthritis of left knee, unspecified osteoarthritis type           Patient Instructions     Patient Instructions   decr sex drive/fatigue----ck testos---poor resp to viagra in past          Chief Complaint     Chief Complaint   Patient presents with    Knee Pain     left side x 3weeks         History of Present Illness       C/o L knee pain---chronic w/ exac----also c/o decr sex drive      Knee Pain          Review of Systems   Review of Systems   Constitutional: Negative for fatigue, fever and unexpected weight change  HENT: Negative for congestion, sinus pressure and sore throat  Eyes: Negative for visual disturbance  Respiratory: Negative for shortness of breath and wheezing  Cardiovascular: Negative for chest pain and palpitations  Gastrointestinal: Negative for abdominal pain, diarrhea, nausea and vomiting  Current Medications       Current Outpatient Prescriptions:     butalbital-acetaminophen-caffeine (FIORICET,ESGIC) -40 mg per tablet, Take 1 tablet by mouth every 6 (six) hours as needed for headaches, Disp: 90 tablet, Rfl: 5    diclofenac (VOLTAREN) 75 mg EC tablet, , Disp: , Rfl:     levothyroxine 125 mcg tablet, Take 1 tablet (125 mcg total) by mouth daily, Disp: 90 tablet, Rfl: 3    metoprolol tartrate (LOPRESSOR) 25 mg tablet, TAKE 1 TABLET TWICE DAILY  , Disp: 180 tablet, Rfl: 1    ranitidine (ZANTAC) 150 mg tablet, Take 1 tablet by mouth daily as needed, Disp: , Rfl:     predniSONE 20 mg tablet, TAKE 1 TABLET BID X 5 DAYS THEN TAKE 1 TABLET QD FOR 5 DAYS (Patient not taking: Reported on 2019 ), Disp: 15 tablet, Rfl: 3    Current Allergies     Allergies as of 2019 - Reviewed 2019   Allergen Reaction Noted    Penicillins Hives 11/03/2015            The following portions of the patient's history were reviewed and updated as appropriate: allergies, current medications, past family history, past medical history, past social history, past surgical history and problem list      No past medical history on file  No past surgical history on file  Family History   Problem Relation Age of Onset    Prostate cancer Father     Diabetes Father     Diabetes Sister     Substance Abuse Neg Hx     Mental illness Neg Hx          Medications have been verified  Objective   /70   Pulse 59   Ht 5' 9" (1 753 m)   Wt 96 6 kg (213 lb)   SpO2 98%   BMI 31 45 kg/m²        Physical Exam     Physical Exam   Constitutional: He appears well-developed and well-nourished  No distress  HENT:   Right Ear: Tympanic membrane, external ear and ear canal normal  Tympanic membrane is not injected  Left Ear: Tympanic membrane, external ear and ear canal normal  Tympanic membrane is not injected  Nose: Nose normal    Mouth/Throat: Uvula is midline, oropharynx is clear and moist and mucous membranes are normal    Eyes: Pupils are equal, round, and reactive to light  Conjunctivae are normal    Neck: Normal range of motion  Neck supple  No thyromegaly present  Cardiovascular: Normal rate, regular rhythm and normal heart sounds  No murmur heard  Pulmonary/Chest: Effort normal and breath sounds normal  No respiratory distress  He has no wheezes  Musculoskeletal:   NL L knee exam   Lymphadenopathy:     He has no cervical adenopathy  Skin: He is not diaphoretic

## 2019-02-03 DIAGNOSIS — I10 HYPERTENSION, UNSPECIFIED TYPE: ICD-10-CM

## 2019-02-04 ENCOUNTER — TELEPHONE (OUTPATIENT)
Dept: FAMILY MEDICINE CLINIC | Facility: CLINIC | Age: 64
End: 2019-02-04

## 2019-02-04 NOTE — TELEPHONE ENCOUNTER
----- Message from Shawna Chun MD sent at 2/2/2019  7:01 AM EST -----  MM/LABS 12MOS----testos borderline low but pt very symptomatic---decr sex drive/ED----pt has Rx for testos 1cc q 2 wks---start--wife can inject--try for 3mos

## 2019-04-28 DIAGNOSIS — I10 HYPERTENSION, UNSPECIFIED TYPE: ICD-10-CM

## 2019-11-18 ENCOUNTER — OFFICE VISIT (OUTPATIENT)
Dept: FAMILY MEDICINE CLINIC | Facility: CLINIC | Age: 64
End: 2019-11-18

## 2019-11-18 VITALS
DIASTOLIC BLOOD PRESSURE: 76 MMHG | HEIGHT: 69 IN | WEIGHT: 213 LBS | HEART RATE: 60 BPM | BODY MASS INDEX: 31.55 KG/M2 | SYSTOLIC BLOOD PRESSURE: 124 MMHG | OXYGEN SATURATION: 97 %

## 2019-11-18 DIAGNOSIS — E03.8 HYPOTHYROIDISM DUE TO HASHIMOTO'S THYROIDITIS: ICD-10-CM

## 2019-11-18 DIAGNOSIS — I10 HYPERTENSION, UNSPECIFIED TYPE: ICD-10-CM

## 2019-11-18 DIAGNOSIS — E06.3 HYPOTHYROIDISM DUE TO HASHIMOTO'S THYROIDITIS: ICD-10-CM

## 2019-11-18 DIAGNOSIS — M17.0 PRIMARY OSTEOARTHRITIS OF BOTH KNEES: ICD-10-CM

## 2019-11-18 DIAGNOSIS — R52 PAIN: ICD-10-CM

## 2019-11-18 PROCEDURE — 99213 OFFICE O/P EST LOW 20 MIN: CPT | Performed by: FAMILY MEDICINE

## 2019-11-18 RX ORDER — LEVOTHYROXINE SODIUM 0.12 MG/1
125 TABLET ORAL DAILY
Qty: 90 TABLET | Refills: 3 | Status: SHIPPED | OUTPATIENT
Start: 2019-11-18

## 2019-11-18 RX ORDER — PREDNISONE 20 MG/1
TABLET ORAL
Qty: 15 TABLET | Refills: 3 | Status: SHIPPED | OUTPATIENT
Start: 2019-11-18

## 2019-11-18 RX ORDER — BUTALBITAL, ACETAMINOPHEN AND CAFFEINE 50; 325; 40 MG/1; MG/1; MG/1
1 TABLET ORAL EVERY 6 HOURS PRN
Qty: 90 TABLET | Refills: 5 | Status: SHIPPED | OUTPATIENT
Start: 2019-11-18

## 2019-11-18 NOTE — PROGRESS NOTES
[facility]        NAME: Maddison Sanderson is a 60 y o  male  : 1955    MRN: [id number]  DATE: 2019  TIME: 9:07 AM    Assessment and Plan   No primary diagnosis found  1  Primary osteoarthritis of both knees  predniSONE 20 mg tablet   2  Hypertension, unspecified type  metoprolol tartrate (LOPRESSOR) 25 mg tablet   3  Hypothyroidism due to Hashimoto's thyroiditis  levothyroxine 125 mcg tablet   4  Pain  butalbital-acetaminophen-caffeine (FIORICET,ESGIC) -40 mg per tablet         Patient Instructions     There are no Patient Instructions on file for this visit  Chief Complaint     Chief Complaint   Patient presents with    Follow-up     MM         History of Present Illness       F/u assessed med cond--stable      Review of Systems   Review of Systems   Constitutional: Negative for fatigue, fever and unexpected weight change  HENT: Negative for congestion, sinus pain and sore throat  Eyes: Negative for visual disturbance  Respiratory: Negative for shortness of breath and wheezing  Cardiovascular: Negative for chest pain and palpitations  Gastrointestinal: Negative for abdominal pain, nausea and vomiting  Musculoskeletal: Negative  Negative for arthralgias and myalgias  Neurological: Negative for syncope, weakness and numbness  Psychiatric/Behavioral: Negative  Negative for confusion, dysphoric mood and suicidal ideas           Current Medications       Current Outpatient Medications:     butalbital-acetaminophen-caffeine (FIORICET,ESGIC) -40 mg per tablet, Take 1 tablet by mouth every 6 (six) hours as needed for headaches, Disp: 90 tablet, Rfl: 5    diclofenac (VOLTAREN) 75 mg EC tablet, , Disp: , Rfl:     levothyroxine 125 mcg tablet, Take 1 tablet (125 mcg total) by mouth daily, Disp: 90 tablet, Rfl: 3    metoprolol tartrate (LOPRESSOR) 25 mg tablet, Take 1 tablet (25 mg total) by mouth 2 (two) times a day, Disp: 180 tablet, Rfl: 1    predniSONE 20 mg tablet, TAKE 1 TABLET BID X 5 DAYS THEN TAKE 1 TABLET QD FOR 5 DAYS, Disp: 15 tablet, Rfl: 3    ranitidine (ZANTAC) 150 mg tablet, Take 1 tablet by mouth daily as needed, Disp: , Rfl:     Current Allergies     Allergies as of 11/18/2019 - Reviewed 11/18/2019   Allergen Reaction Noted    Penicillins Hives 11/03/2015            The following portions of the patient's history were reviewed and updated as appropriate: allergies, current medications, past family history, past medical history, past social history, past surgical history and problem list      No past medical history on file  No past surgical history on file  Family History   Problem Relation Age of Onset    Prostate cancer Father     Diabetes Father     Diabetes Sister     Substance Abuse Neg Hx     Mental illness Neg Hx          Medications have been verified  Objective   /76   Pulse 60   Ht 5' 9" (1 753 m)   Wt 96 6 kg (213 lb)   SpO2 97%   BMI 31 45 kg/m²        Physical Exam     Physical Exam   Constitutional: He is oriented to person, place, and time  Vital signs are normal  He appears well-developed and well-nourished  HENT:   Right Ear: Ear canal normal  Tympanic membrane is not injected  Left Ear: Ear canal normal  Tympanic membrane is not injected  Nose: Nose normal    Mouth/Throat: Oropharynx is clear and moist    Eyes: Pupils are equal, round, and reactive to light  Conjunctivae and EOM are normal  Right eye exhibits no discharge  Left eye exhibits no discharge  Neck: Normal range of motion  Neck supple  No thyromegaly present  Cardiovascular: Normal rate, regular rhythm and normal heart sounds  No murmur heard  Pulmonary/Chest: Effort normal and breath sounds normal  No respiratory distress  He has no wheezes  Abdominal: Soft  Bowel sounds are normal  He exhibits no distension  There is no tenderness  Musculoskeletal: Normal range of motion     Lymphadenopathy:     He has no cervical adenopathy  Neurological: He is alert and oriented to person, place, and time  He has normal strength and normal reflexes  He is not disoriented  No sensory deficit  Gait normal    Skin: Skin is warm and dry  Psychiatric: He has a normal mood and affect  His speech is normal and behavior is normal  Judgment and thought content normal  Cognition and memory are normal      BMI Counseling: Body mass index is 31 45 kg/m²  The BMI is above normal  Nutrition recommendations include reducing portion sizes